# Patient Record
Sex: MALE | Race: WHITE | Employment: STUDENT | URBAN - METROPOLITAN AREA
[De-identification: names, ages, dates, MRNs, and addresses within clinical notes are randomized per-mention and may not be internally consistent; named-entity substitution may affect disease eponyms.]

---
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
## 2023-01-13 ENCOUNTER — TELEPHONE (OUTPATIENT)
Dept: TRANSPLANT | Age: 18
End: 2023-01-13

## 2023-01-20 ENCOUNTER — TELEPHONE (OUTPATIENT)
Dept: TRANSPLANT | Age: 18
End: 2023-01-20

## 2023-01-23 ENCOUNTER — TELEPHONE (OUTPATIENT)
Dept: TRANSPLANT | Age: 18
End: 2023-01-23

## 2023-03-20 RX ORDER — CARVEDILOL 3.12 MG/1
TABLET ORAL
Qty: 180 TABLET | Refills: 1 | Status: SHIPPED | OUTPATIENT
Start: 2023-03-20

## 2023-08-04 ENCOUNTER — TELEPHONE (OUTPATIENT)
Dept: CARDIOLOGY | Age: 18
End: 2023-08-04

## 2023-08-16 ENCOUNTER — TELEPHONE (OUTPATIENT)
Dept: TRANSPLANT | Age: 18
End: 2023-08-16

## 2023-08-16 DIAGNOSIS — I49.3 PVC'S (PREMATURE VENTRICULAR CONTRACTIONS): ICD-10-CM

## 2023-08-16 DIAGNOSIS — R93.1 ABNORMAL ECHOCARDIOGRAM: Primary | ICD-10-CM

## 2023-08-16 DIAGNOSIS — D69.3 ACUTE ITP (CMD): ICD-10-CM

## 2023-08-18 ENCOUNTER — TELEPHONE (OUTPATIENT)
Dept: TRANSPLANT | Age: 18
End: 2023-08-18

## 2023-08-22 ENCOUNTER — ANCILLARY PROCEDURE (OUTPATIENT)
Dept: CARDIOLOGY | Age: 18
End: 2023-08-22
Attending: INTERNAL MEDICINE

## 2023-08-22 DIAGNOSIS — I49.3 PVC'S (PREMATURE VENTRICULAR CONTRACTIONS): ICD-10-CM

## 2023-08-22 DIAGNOSIS — R93.1 ABNORMAL ECHOCARDIOGRAM: ICD-10-CM

## 2023-08-22 DIAGNOSIS — D69.3 ACUTE ITP (CMD): ICD-10-CM

## 2023-08-22 LAB
AORTIC VALVE AREA (AVA): 0.72
AORTIC VALVE AREA: 2.33
AV MEAN GRADIENT (AVMG): 3
AV MEAN VELOCITY (AVMV): 0.75
AV PEAK GRADIENT (AVPG): 5
AV PEAK VELOCITY (AVPV): 1.14
AV STENOSIS SEVERITY TEXT: NORMAL
AVI LVOT PEAK GRADIENT (LVOTMG): 1.1
E WAVE DECELARATION TIME (MDT): 8.35
INTERVENTRICULAR SEPTUM IN END DIASTOLE (IVSD): 1.75
LEFT INTERNAL DIMENSION IN SYSTOLE (LVSD): 1.1
LEFT VENTRICULAR INTERNAL DIMENSION IN DIASTOLE (LVDD): 3.5
LEFT VENTRICULAR POSTERIOR WALL IN END DIASTOLE (LVPW): 5.3
LV EF: NORMAL %
LVOT 2D (LVOTD): 18.6
LVOT VTI (LVOTVTI): 0.93
MV E TISSUE VEL LAT (MELV): 1.37
MV E TISSUE VEL MED (MESV): 18.8
MV E WAVE VEL/E TISSUE VEL MED(MSR): 8.59
MV PEAK A VELOCITY (MVPAV): 349
MV PEAK E VELOCITY (MVPEV): 0.37
RV END SYSTOLIC LONGITUDINAL STRAIN FREE WALL (RVGS): 2
TRICUSPID VALVE PEAK REGURGITATION VELOCITY (TRPV): 3.1
TV ESTIMATED RIGHT ARTERIAL PRESSURE (RAP): 14

## 2023-08-22 PROCEDURE — 93356 MYOCRD STRAIN IMG SPCKL TRCK: CPT | Performed by: INTERNAL MEDICINE

## 2023-08-22 PROCEDURE — 93306 TTE W/DOPPLER COMPLETE: CPT | Performed by: INTERNAL MEDICINE

## 2023-08-25 ENCOUNTER — OFFICE VISIT (OUTPATIENT)
Dept: TRANSPLANT | Age: 18
End: 2023-08-25
Attending: INTERNAL MEDICINE

## 2023-08-25 VITALS
OXYGEN SATURATION: 100 % | HEART RATE: 51 BPM | BODY MASS INDEX: 28.19 KG/M2 | DIASTOLIC BLOOD PRESSURE: 85 MMHG | SYSTOLIC BLOOD PRESSURE: 138 MMHG | WEIGHT: 208.11 LBS | TEMPERATURE: 97.5 F | HEIGHT: 72 IN

## 2023-08-25 DIAGNOSIS — I50.9 ACC/AHA STAGE B CONGESTIVE HEART FAILURE (CMD): Primary | ICD-10-CM

## 2023-08-25 PROCEDURE — 99211 OFF/OP EST MAY X REQ PHY/QHP: CPT

## 2023-08-25 PROCEDURE — 99214 OFFICE O/P EST MOD 30 MIN: CPT | Performed by: INTERNAL MEDICINE

## 2023-08-25 ASSESSMENT — ENCOUNTER SYMPTOMS
WEAKNESS: 0
UNEXPECTED WEIGHT CHANGE: 0
APPETITE CHANGE: 0
APNEA: 0
HEADACHES: 0
CHEST TIGHTNESS: 0
ACTIVITY CHANGE: 0
COLOR CHANGE: 0
SORE THROAT: 0
RHINORRHEA: 0
DIARRHEA: 0
TREMORS: 0
VOMITING: 0
NAUSEA: 0
SPEECH DIFFICULTY: 0
STRIDOR: 0
EYE PAIN: 0
NUMBNESS: 0
CONSTIPATION: 0
BLOOD IN STOOL: 0

## 2023-08-27 ENCOUNTER — E-ADVICE (OUTPATIENT)
Dept: TRANSPLANT | Age: 18
End: 2023-08-27

## 2023-11-17 ENCOUNTER — TELEPHONE (OUTPATIENT)
Dept: TRANSPLANT | Age: 18
End: 2023-11-17

## 2023-11-21 ENCOUNTER — LAB SERVICES (OUTPATIENT)
Dept: LAB | Age: 18
End: 2023-11-21

## 2023-11-21 ENCOUNTER — OFFICE VISIT (OUTPATIENT)
Dept: TRANSPLANT | Age: 18
End: 2023-11-21
Attending: INTERNAL MEDICINE

## 2023-11-21 VITALS
SYSTOLIC BLOOD PRESSURE: 124 MMHG | WEIGHT: 212.52 LBS | OXYGEN SATURATION: 100 % | HEART RATE: 65 BPM | DIASTOLIC BLOOD PRESSURE: 76 MMHG | TEMPERATURE: 97.4 F | HEIGHT: 72 IN | BODY MASS INDEX: 28.79 KG/M2

## 2023-11-21 DIAGNOSIS — I50.22 ACC/AHA STAGE C CHRONIC SYSTOLIC HEART FAILURE (CMD): Primary | ICD-10-CM

## 2023-11-21 DIAGNOSIS — Z09 CHEMOTHERAPY FOLLOW-UP EXAMINATION: ICD-10-CM

## 2023-11-21 DIAGNOSIS — I50.9 ACC/AHA STAGE B CONGESTIVE HEART FAILURE (CMD): Primary | ICD-10-CM

## 2023-11-21 DIAGNOSIS — R93.1 ABNORMAL ECHOCARDIOGRAM: ICD-10-CM

## 2023-11-21 DIAGNOSIS — I50.9 ACC/AHA STAGE B CONGESTIVE HEART FAILURE (CMD): ICD-10-CM

## 2023-11-21 LAB
ALBUMIN SERPL-MCNC: 4.1 G/DL (ref 3.6–5.1)
ALBUMIN/GLOB SERPL: 1.1 {RATIO} (ref 1–2.4)
ALP SERPL-CCNC: 64 UNITS/L (ref 45–117)
ALT SERPL-CCNC: 30 UNITS/L
ANION GAP SERPL CALC-SCNC: 7 MMOL/L (ref 7–19)
AST SERPL-CCNC: 21 UNITS/L
BILIRUB SERPL-MCNC: 0.8 MG/DL (ref 0.2–1)
BUN SERPL-MCNC: 22 MG/DL (ref 6–20)
BUN/CREAT SERPL: 18 (ref 7–25)
CALCIUM SERPL-MCNC: 9.4 MG/DL (ref 8.4–10.2)
CHLORIDE SERPL-SCNC: 103 MMOL/L (ref 97–110)
CO2 SERPL-SCNC: 29 MMOL/L (ref 21–32)
CREAT SERPL-MCNC: 1.2 MG/DL (ref 0.67–1.17)
EGFRCR SERPLBLD CKD-EPI 2021: 89 ML/MIN/{1.73_M2}
FASTING DURATION TIME PATIENT: ABNORMAL H
GLOBULIN SER-MCNC: 3.7 G/DL (ref 2–4)
GLUCOSE SERPL-MCNC: 85 MG/DL (ref 70–99)
NT-PROBNP SERPL-MCNC: 17 PG/ML
POTASSIUM SERPL-SCNC: 4.3 MMOL/L (ref 3.4–5.1)
PROT SERPL-MCNC: 7.8 G/DL (ref 6.4–8.2)
SODIUM SERPL-SCNC: 135 MMOL/L (ref 135–145)

## 2023-11-21 PROCEDURE — 99211 OFF/OP EST MAY X REQ PHY/QHP: CPT

## 2023-11-21 PROCEDURE — 80053 COMPREHEN METABOLIC PANEL: CPT | Performed by: INTERNAL MEDICINE

## 2023-11-21 PROCEDURE — 83880 ASSAY OF NATRIURETIC PEPTIDE: CPT | Performed by: INTERNAL MEDICINE

## 2023-11-21 PROCEDURE — 36415 COLL VENOUS BLD VENIPUNCTURE: CPT | Performed by: INTERNAL MEDICINE

## 2023-11-21 PROCEDURE — 99214 OFFICE O/P EST MOD 30 MIN: CPT | Performed by: INTERNAL MEDICINE

## 2023-11-21 RX ORDER — CARVEDILOL 6.25 MG/1
6.25 TABLET ORAL 2 TIMES DAILY WITH MEALS
Qty: 180 TABLET | Refills: 3 | Status: SHIPPED | OUTPATIENT
Start: 2023-11-21 | End: 2024-02-19

## 2023-11-21 ASSESSMENT — ENCOUNTER SYMPTOMS
NUMBNESS: 0
COLOR CHANGE: 0
BLOOD IN STOOL: 0
ACTIVITY CHANGE: 0
SPEECH DIFFICULTY: 0
CONSTIPATION: 0
SORE THROAT: 0
VOMITING: 0
STRIDOR: 0
CHEST TIGHTNESS: 0
APNEA: 0
NAUSEA: 0
TREMORS: 0
DIARRHEA: 0
RHINORRHEA: 0
APPETITE CHANGE: 0
WEAKNESS: 0
HEADACHES: 0
EYE PAIN: 0
UNEXPECTED WEIGHT CHANGE: 0

## 2023-11-26 ENCOUNTER — HOSPITAL ENCOUNTER (OUTPATIENT)
Age: 18
Discharge: HOME | End: 2023-11-26
Payer: COMMERCIAL

## 2023-11-26 VITALS
SYSTOLIC BLOOD PRESSURE: 131 MMHG | RESPIRATION RATE: 24 BRPM | HEART RATE: 88 BPM | DIASTOLIC BLOOD PRESSURE: 68 MMHG | TEMPERATURE: 98 F | OXYGEN SATURATION: 97 %

## 2023-11-26 DIAGNOSIS — J02.9 PHARYNGITIS, UNSPECIFIED ETIOLOGY: Primary | ICD-10-CM

## 2023-11-26 LAB — S PYO AG THROAT QL: NEGATIVE

## 2023-11-26 PROCEDURE — 99203 OFFICE O/P NEW LOW 30 MIN: CPT | Performed by: NURSE PRACTITIONER

## 2023-11-26 PROCEDURE — 87880 STREP A ASSAY W/OPTIC: CPT | Performed by: NURSE PRACTITIONER

## 2023-11-26 RX ORDER — CARVEDILOL 6.25 MG/1
6.25 TABLET ORAL 2 TIMES DAILY WITH MEALS
COMMUNITY
Start: 2023-11-21 | End: 2024-02-19

## 2023-11-28 RX ORDER — AMOXICILLIN 500 MG/1
500 TABLET, FILM COATED ORAL 2 TIMES DAILY
Qty: 20 TABLET | Refills: 0 | Status: SHIPPED | OUTPATIENT
Start: 2023-11-28 | End: 2023-12-08

## 2023-12-01 PROBLEM — Z09 CHEMOTHERAPY FOLLOW-UP EXAMINATION: Status: ACTIVE | Noted: 2023-12-01

## 2023-12-01 PROBLEM — I50.22 ACC/AHA STAGE C CHRONIC SYSTOLIC HEART FAILURE  (CMD): Status: ACTIVE | Noted: 2023-12-01

## 2024-01-05 ENCOUNTER — TELEPHONE (OUTPATIENT)
Dept: TRANSPLANT | Age: 19
End: 2024-01-05

## 2024-01-10 ENCOUNTER — TELEPHONE (OUTPATIENT)
Dept: TRANSPLANT | Age: 19
End: 2024-01-10

## 2024-01-15 ENCOUNTER — APPOINTMENT (OUTPATIENT)
Dept: CARDIOLOGY | Age: 19
End: 2024-01-15
Attending: INTERNAL MEDICINE

## 2024-01-15 DIAGNOSIS — I50.9 ACC/AHA STAGE B CONGESTIVE HEART FAILURE (CMD): ICD-10-CM

## 2024-01-15 LAB
AORTIC VALVE AREA (AVA): 0.66
AORTIC VALVE AREA: 3.79
ASCENDING AORTA (AAD): 3
AV MEAN GRADIENT (AVMG): 2
AV MEAN VELOCITY (AVMV): 0.71
AV PEAK GRADIENT (AVPG): 5
AV PEAK VELOCITY (AVPV): 1.13
AV STENOSIS SEVERITY TEXT: NORMAL
AVI LVOT PEAK GRADIENT (LVOTMG): 1.1
E WAVE DECELARATION TIME (MDT): 6.87
INTERVENTRICULAR SEPTUM IN END DIASTOLE (IVSD): 1.7
LEFT INTERNAL DIMENSION IN SYSTOLE (LVSD): 1.1
LEFT VENTRICULAR INTERNAL DIMENSION IN DIASTOLE (LVDD): 3.8
LEFT VENTRICULAR POSTERIOR WALL IN END DIASTOLE (LVPW): 5.5
LV EF: NORMAL %
LVOT 2D (LVOTD): 16.7
LVOT VTI (LVOTVTI): 0.82
MV E TISSUE VEL LAT (MELV): 1.16
MV E TISSUE VEL MED (MESV): 20
MV E WAVE VEL/E TISSUE VEL MED(MSR): 9.67
MV PEAK A VELOCITY (MVPAV): 247
MV PEAK E VELOCITY (MVPEV): 0.29
RV END SYSTOLIC LONGITUDINAL STRAIN FREE WALL (RVGS): 2.6
TRICUSPID VALVE ANNULAR PEAK VELOCITY (TVAPV): 23
TRICUSPID VALVE PEAK REGURGITATION VELOCITY (TRPV): 2.8

## 2024-01-15 PROCEDURE — 93306 TTE W/DOPPLER COMPLETE: CPT | Performed by: INTERNAL MEDICINE

## 2024-02-02 ENCOUNTER — TELEPHONE (OUTPATIENT)
Dept: TRANSPLANT | Age: 19
End: 2024-02-02

## 2024-02-10 ENCOUNTER — APPOINTMENT (OUTPATIENT)
Dept: CARDIOLOGY | Age: 19
End: 2024-02-10
Attending: INTERNAL MEDICINE

## 2024-07-17 ENCOUNTER — HOSPITAL ENCOUNTER (OUTPATIENT)
Dept: CARDIOLOGY | Age: 19
Discharge: HOME | End: 2024-07-17
Attending: INTERNAL MEDICINE

## 2024-07-17 ENCOUNTER — ANCILLARY PROCEDURE (OUTPATIENT)
Dept: LAB | Age: 19
End: 2024-07-17
Attending: INTERNAL MEDICINE

## 2024-07-17 ENCOUNTER — APPOINTMENT (OUTPATIENT)
Dept: CARDIOLOGY | Age: 19
End: 2024-07-17
Attending: INTERNAL MEDICINE

## 2024-07-17 DIAGNOSIS — R93.1 ABNORMAL ECHOCARDIOGRAM: ICD-10-CM

## 2024-07-17 DIAGNOSIS — R93.1 ABNORMAL ECHOCARDIOGRAM: Primary | ICD-10-CM

## 2024-07-17 LAB
ATRIAL RATE (BPM): 64
P AXIS (DEGREES): 26
PR-INTERVAL (MSEC): 140
QRS-INTERVAL (MSEC): 102
QT-INTERVAL (MSEC): 386
QTC: 398
R AXIS (DEGREES): 70
REPORT TEXT: NORMAL
T AXIS (DEGREES): 32
VENTRICULAR RATE EKG/MIN (BPM): 64

## 2024-07-17 PROCEDURE — 93010 ELECTROCARDIOGRAM REPORT: CPT | Performed by: INTERNAL MEDICINE

## 2024-07-17 PROCEDURE — 93005 ELECTROCARDIOGRAM TRACING: CPT

## 2024-08-07 ENCOUNTER — TELEPHONE (OUTPATIENT)
Dept: TRANSPLANT | Age: 19
End: 2024-08-07

## 2024-08-16 ENCOUNTER — TELEPHONE (OUTPATIENT)
Dept: TRANSPLANT | Age: 19
End: 2024-08-16

## 2024-08-21 ENCOUNTER — OFFICE VISIT (OUTPATIENT)
Dept: TRANSPLANT | Age: 19
End: 2024-08-21

## 2024-08-21 DIAGNOSIS — I50.22 HEART FAILURE WITH MILDLY REDUCED EJECTION FRACTION (HFMREF)  (CMD): Primary | ICD-10-CM

## 2024-08-21 PROCEDURE — G2211 COMPLEX E/M VISIT ADD ON: HCPCS | Performed by: INTERNAL MEDICINE

## 2024-08-21 PROCEDURE — 99214 OFFICE O/P EST MOD 30 MIN: CPT | Performed by: INTERNAL MEDICINE

## 2024-08-21 ASSESSMENT — ENCOUNTER SYMPTOMS
HEADACHES: 0
APPETITE CHANGE: 0
SORE THROAT: 0
WEAKNESS: 0
NAUSEA: 0
VOMITING: 0
COLOR CHANGE: 0
CONSTIPATION: 0
TREMORS: 0
RHINORRHEA: 0
STRIDOR: 0
ACTIVITY CHANGE: 0
EYE PAIN: 0
CHEST TIGHTNESS: 0
BLOOD IN STOOL: 0
SPEECH DIFFICULTY: 0
NUMBNESS: 0
APNEA: 0
UNEXPECTED WEIGHT CHANGE: 0
DIARRHEA: 0

## 2024-12-27 ENCOUNTER — TELEPHONE (OUTPATIENT)
Dept: TRANSPLANT | Age: 19
End: 2024-12-27

## 2024-12-30 DIAGNOSIS — Z09 CHEMOTHERAPY FOLLOW-UP EXAMINATION: ICD-10-CM

## 2024-12-30 DIAGNOSIS — I50.22 HEART FAILURE WITH MILDLY REDUCED EJECTION FRACTION (HFMREF)  (CMD): Primary | ICD-10-CM

## 2025-01-02 ENCOUNTER — HOSPITAL ENCOUNTER (OUTPATIENT)
Dept: CARDIOLOGY | Age: 20
Discharge: HOME | End: 2025-01-02
Attending: INTERNAL MEDICINE

## 2025-01-02 ENCOUNTER — APPOINTMENT (OUTPATIENT)
Dept: CARDIOLOGY | Age: 20
End: 2025-01-02

## 2025-01-02 ENCOUNTER — LAB SERVICES (OUTPATIENT)
Dept: LAB | Age: 20
End: 2025-01-02

## 2025-01-02 DIAGNOSIS — I50.22 HEART FAILURE WITH MILDLY REDUCED EJECTION FRACTION (HFMREF)  (CMD): ICD-10-CM

## 2025-01-02 DIAGNOSIS — Z09 CHEMOTHERAPY FOLLOW-UP EXAMINATION: ICD-10-CM

## 2025-01-02 LAB
ALBUMIN SERPL-MCNC: 4.3 G/DL (ref 3.4–5)
ALBUMIN/GLOB SERPL: 1.3 {RATIO} (ref 1–2.4)
ALP SERPL-CCNC: 64 UNITS/L (ref 45–117)
ALT SERPL-CCNC: 23 UNITS/L
ANION GAP SERPL CALC-SCNC: 6 MMOL/L (ref 7–19)
AORTIC VALVE AREA (AVA): 0.69
ASCENDING AORTA (AAD): 3
AST SERPL-CCNC: 18 UNITS/L
AV STENOSIS SEVERITY TEXT: NORMAL
AVI LVOT PEAK GRADIENT (LVOTMG): 1.1
BILIRUB SERPL-MCNC: 0.8 MG/DL (ref 0.2–1)
BUN SERPL-MCNC: 18 MG/DL (ref 6–20)
BUN/CREAT SERPL: 16 (ref 7–25)
CALCIUM SERPL-MCNC: 9.6 MG/DL (ref 8.4–10.2)
CHLORIDE SERPL-SCNC: 106 MMOL/L (ref 97–110)
CO2 SERPL-SCNC: 29 MMOL/L (ref 21–32)
CREAT SERPL-MCNC: 1.14 MG/DL (ref 0.67–1.17)
E WAVE DECELARATION TIME (MDT): 5.2
EGFRCR SERPLBLD CKD-EPI 2021: >90 ML/MIN/{1.73_M2}
FASTING DURATION TIME PATIENT: ABNORMAL H
GLOBULIN SER-MCNC: 3.3 G/DL (ref 2–4)
GLUCOSE SERPL-MCNC: 100 MG/DL (ref 70–99)
LEFT INTERNAL DIMENSION IN SYSTOLE (LVSD): 1
LEFT VENTRICULAR INTERNAL DIMENSION IN DIASTOLE (LVDD): 3.6
LEFT VENTRICULAR POSTERIOR WALL IN END DIASTOLE (LVPW): 5
LV EF: NORMAL %
MV E TISSUE VEL MED (MESV): 17.6
MV E WAVE VEL/E TISSUE VEL MED(MSR): 13.2
MV PEAK A VELOCITY (MVPAV): 364
MV PEAK E VELOCITY (MVPEV): 0.32
NT-PROBNP SERPL-MCNC: 55 PG/ML
POTASSIUM SERPL-SCNC: 4.2 MMOL/L (ref 3.4–5.1)
PROT SERPL-MCNC: 7.6 G/DL (ref 6.4–8.2)
SODIUM SERPL-SCNC: 137 MMOL/L (ref 135–145)

## 2025-01-02 PROCEDURE — 80053 COMPREHEN METABOLIC PANEL: CPT | Performed by: INTERNAL MEDICINE

## 2025-01-02 PROCEDURE — 93306 TTE W/DOPPLER COMPLETE: CPT

## 2025-01-02 PROCEDURE — 36415 COLL VENOUS BLD VENIPUNCTURE: CPT | Performed by: INTERNAL MEDICINE

## 2025-01-02 PROCEDURE — 83880 ASSAY OF NATRIURETIC PEPTIDE: CPT | Performed by: INTERNAL MEDICINE

## 2025-01-03 ENCOUNTER — OFFICE VISIT (OUTPATIENT)
Dept: TRANSPLANT | Age: 20
End: 2025-01-03

## 2025-01-03 VITALS
HEART RATE: 61 BPM | OXYGEN SATURATION: 99 % | DIASTOLIC BLOOD PRESSURE: 67 MMHG | WEIGHT: 216.05 LBS | BODY MASS INDEX: 29.26 KG/M2 | HEIGHT: 72 IN | TEMPERATURE: 97.8 F | SYSTOLIC BLOOD PRESSURE: 107 MMHG

## 2025-01-03 DIAGNOSIS — I50.9 ACC/AHA STAGE B CONGESTIVE HEART FAILURE  (CMD): Primary | ICD-10-CM

## 2025-01-03 PROCEDURE — 99214 OFFICE O/P EST MOD 30 MIN: CPT | Performed by: INTERNAL MEDICINE

## 2025-01-03 PROCEDURE — 99211 OFF/OP EST MAY X REQ PHY/QHP: CPT

## 2025-01-03 PROCEDURE — G2211 COMPLEX E/M VISIT ADD ON: HCPCS | Performed by: INTERNAL MEDICINE

## 2025-01-03 RX ORDER — CARVEDILOL 6.25 MG/1
6.25 TABLET ORAL 2 TIMES DAILY WITH MEALS
Qty: 180 TABLET | Refills: 3 | Status: SHIPPED | OUTPATIENT
Start: 2025-01-03 | End: 2025-12-29

## 2025-01-03 ASSESSMENT — ENCOUNTER SYMPTOMS
HEADACHES: 0
RHINORRHEA: 0
SPEECH DIFFICULTY: 0
BLOOD IN STOOL: 0
EYE PAIN: 0
WEAKNESS: 0
APNEA: 0
APPETITE CHANGE: 0
VOMITING: 0
TREMORS: 0
SORE THROAT: 0
NUMBNESS: 0
UNEXPECTED WEIGHT CHANGE: 0
COLOR CHANGE: 0
NAUSEA: 0
DIARRHEA: 0
ACTIVITY CHANGE: 0
CONSTIPATION: 0
CHEST TIGHTNESS: 0
STRIDOR: 0

## 2025-08-01 ENCOUNTER — TELEPHONE (OUTPATIENT)
Dept: TRANSPLANT | Age: 20
End: 2025-08-01

## 2025-08-12 DIAGNOSIS — I49.3 PVC'S (PREMATURE VENTRICULAR CONTRACTIONS): ICD-10-CM

## 2025-08-12 DIAGNOSIS — Z09 CHEMOTHERAPY FOLLOW-UP EXAMINATION: ICD-10-CM

## 2025-08-12 DIAGNOSIS — I50.22 HEART FAILURE WITH MILDLY REDUCED EJECTION FRACTION (HFMREF)  (CMD): Primary | ICD-10-CM

## 2025-08-12 DIAGNOSIS — R93.1 ABNORMAL ECHOCARDIOGRAM: ICD-10-CM

## 2025-08-12 DIAGNOSIS — I50.9 ACC/AHA STAGE B CONGESTIVE HEART FAILURE  (CMD): ICD-10-CM

## 2025-08-19 ENCOUNTER — APPOINTMENT (OUTPATIENT)
Dept: TRANSPLANT | Age: 20
End: 2025-08-19

## (undated) NOTE — ED AVS SNAPSHOT
[facility] in [address] [place] [address] Drive,[address]    [address] [place] [address]    Phone:  [phone]    Fax:  [phone]           Maurilio Payam   MRN: [id number]    Department:  THE [facility] in [facility]   Date of Visit:  5/20/2017 To Check ER Wait Times:  TEXT 'ERwait' to 03663      Click [email or web address]      Or call [phone]    If you have any problems with your follow-up, please call our  at [phone].     Si usted tiene algun problema con maravilla sequimiento, I have read and understand the instructions given to me by my caregivers. 24[facility]Hour Pharm[facility]        Pharmacy Address Phone Number   [facility] ([address]) [place] Dictated by: Anya Thomas MD on 5/20/2017 at 15:00       Approved by: Anya Thomas MD              Narrative:    PROCEDURE:  XR FOOT, COMPLETE (MIN 3 VIEWS), RIGHT (CPT=73630)     TECHNIQUE:  AP, oblique, and lateral views were obtained.      Sabine Arias

## (undated) NOTE — LETTER
Date & Time: 9/15/2018, 5:06 PM  Patient: Burgess Horn  Encounter Provider(s):    Gerardo Rossi       To Whom It May Concern:    Filomena Wilson was seen and treated in our department on 9/15/2018.  He should not participate in gym/sports until

## (undated) NOTE — ED AVS SNAPSHOT
Makenzie Alba   MRN: [id number]    Department:  [facility] Emergency Department   Date of Visit:  9/16/2018           Disclosure     Insurance plans vary and the physician(s) referred by the ER may not be covered by your plan.  Please contact y tell this physician (or your personal doctor if your instructions are to return to your personal doctor) about any new or lasting problems. The primary care or specialist physician will see patients referred from the [facility] Emergency Department.  Elfego Finney